# Patient Record
Sex: FEMALE
[De-identification: names, ages, dates, MRNs, and addresses within clinical notes are randomized per-mention and may not be internally consistent; named-entity substitution may affect disease eponyms.]

---
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
## 2020-06-22 ENCOUNTER — HOSPITAL ENCOUNTER (EMERGENCY)
Dept: HOSPITAL 62 - ER | Age: 20
LOS: 1 days | Discharge: LEFT BEFORE DISCHARGE | End: 2020-06-23
Payer: SELF-PAY

## 2020-06-22 VITALS — DIASTOLIC BLOOD PRESSURE: 65 MMHG | SYSTOLIC BLOOD PRESSURE: 112 MMHG

## 2020-06-22 DIAGNOSIS — R30.0: ICD-10-CM

## 2020-06-22 DIAGNOSIS — R22.2: Primary | ICD-10-CM

## 2020-06-22 DIAGNOSIS — Z53.20: ICD-10-CM

## 2020-06-22 PROCEDURE — 99281 EMR DPT VST MAYX REQ PHY/QHP: CPT

## 2020-06-22 NOTE — ER DOCUMENT REPORT
HPI





- HPI


Time Seen by Provider: 06/22/20 21:19


Pain Level: 3


Notes: 





19-year-old female patient presenting to the emergency department with concern 

for swollen labia and abnormal discharge.








- REPRODUCTIVE


LMP: MAY 15


Reproductive: DENIES: Pregnant:





Past Medical History





- Social History


Smoking Status: Current Every Day Smoker


Frequency of alcohol use: None


Drug Abuse: None


Patient has homicidal ideation: No


Renal/ Medical History: Denies: Hx Peritoneal Dialysis





Course





- Vital Signs


Vital signs: 


                                        











Temp Pulse Resp BP Pulse Ox


 


 98.5 F   82   18   112/65   98 


 


 06/22/20 21:16  06/22/20 21:06  06/22/20 21:06  06/22/20 21:06  06/22/20 21:06

## 2020-06-23 NOTE — ER DOCUMENT REPORT
ED Medical Screen (RME)





- General


Chief Complaint: Vaginal Itching


Stated Complaint: SWOLLEN LABIA, VAGINAL PAIN & ITCHINESS


Time Seen by Provider: 06/22/20 21:19


Mode of Arrival: Ambulatory


Information source: Patient


Notes: 





19-year-old female patient presenting to the emergency department with concern 

for swollen labia and abnormal discharge.  She does report some dysuria but 

denies any frequency.  She states she infections over-the-counter treatment 

which seemed to make things worse.  Was examined in triage, patient appears well

nontoxic.





I have greeted and performed a rapid initial assessment of this patient.  A 

comprehensive ED assessment and evaluation of the patient, analysis of test 

results and completion of the medical decision making process will be conducted 

by additional ED providers.  I have specifically instructed the patient or 

family members with the patient to immediately return to any nursing staff 

should anything change in the patient's condition or with their chief complaint.

















- Related Data


Allergies/Adverse Reactions: 


                                        





No Known Allergies Allergy (Unverified 06/22/20 21:15)


   











Past Medical History





- Social History


Frequency of alcohol use: None


Drug Abuse: None


Renal/ Medical History: Denies: Hx Peritoneal Dialysis





Physical Exam





- Vital signs


Vitals: 





                                        











Temp Pulse Resp BP Pulse Ox


 


 98.5 F   82   18   112/65   98 


 


 06/22/20 21:06  06/22/20 21:06  06/22/20 21:06  06/22/20 21:06  06/22/20 21:06














Course





- Vital Signs


Vital signs: 





                                        











Temp Pulse Resp BP Pulse Ox


 


 98.5 F   82   18   112/65   98 


 


 06/22/20 21:16  06/22/20 21:06  06/22/20 21:06  06/22/20 21:06  06/22/20 21:06